# Patient Record
Sex: MALE | Race: WHITE | NOT HISPANIC OR LATINO | URBAN - NONMETROPOLITAN AREA
[De-identification: names, ages, dates, MRNs, and addresses within clinical notes are randomized per-mention and may not be internally consistent; named-entity substitution may affect disease eponyms.]

---

## 2024-03-14 PROBLEM — I21.19 INFERIOR MYOCARDIAL INFARCTION (MULTI): Status: ACTIVE | Noted: 2024-03-14

## 2024-03-14 PROBLEM — I25.10 ATHEROSCLEROSIS OF CORONARY ARTERY OF NATIVE HEART: Status: ACTIVE | Noted: 2024-03-14

## 2024-03-14 PROBLEM — E78.2 MIXED HYPERLIPIDEMIA: Status: ACTIVE | Noted: 2024-03-14

## 2024-03-14 PROBLEM — Z87.891 FORMER SMOKER: Status: ACTIVE | Noted: 2024-03-14

## 2024-03-14 PROBLEM — I10 ESSENTIAL HYPERTENSION: Status: ACTIVE | Noted: 2024-03-14

## 2024-03-14 PROBLEM — Z98.62 STATUS POST ANGIOPLASTY: Status: ACTIVE | Noted: 2024-03-14

## 2024-03-14 RX ORDER — ATORVASTATIN CALCIUM 80 MG/1
1 TABLET, FILM COATED ORAL DAILY
COMMUNITY
Start: 2021-09-21

## 2024-03-14 RX ORDER — CARVEDILOL 6.25 MG/1
1 TABLET ORAL 2 TIMES DAILY
COMMUNITY
Start: 2021-09-05

## 2024-03-14 RX ORDER — NITROGLYCERIN 0.4 MG/1
0.4 TABLET SUBLINGUAL EVERY 5 MIN PRN
COMMUNITY

## 2024-03-14 RX ORDER — ASPIRIN 81 MG/1
1 TABLET ORAL DAILY
COMMUNITY

## 2024-08-21 ENCOUNTER — APPOINTMENT (OUTPATIENT)
Dept: CARDIOLOGY | Facility: CLINIC | Age: 59
End: 2024-08-21
Payer: COMMERCIAL

## 2024-09-12 DIAGNOSIS — I25.10 ATHEROSCLEROTIC HEART DISEASE OF NATIVE CORONARY ARTERY WITHOUT ANGINA PECTORIS: ICD-10-CM

## 2024-09-12 RX ORDER — ATORVASTATIN CALCIUM 80 MG/1
80 TABLET, FILM COATED ORAL DAILY
Qty: 90 TABLET | Refills: 0 | Status: SHIPPED | OUTPATIENT
Start: 2024-09-12

## 2024-09-12 RX ORDER — CARVEDILOL 6.25 MG/1
6.25 TABLET ORAL 2 TIMES DAILY
Qty: 180 TABLET | Refills: 0 | Status: SHIPPED | OUTPATIENT
Start: 2024-09-12

## 2024-09-17 ENCOUNTER — APPOINTMENT (OUTPATIENT)
Dept: CARDIOLOGY | Facility: CLINIC | Age: 59
End: 2024-09-17
Payer: COMMERCIAL

## 2024-09-17 VITALS
HEART RATE: 68 BPM | WEIGHT: 233 LBS | BODY MASS INDEX: 33.36 KG/M2 | DIASTOLIC BLOOD PRESSURE: 72 MMHG | SYSTOLIC BLOOD PRESSURE: 134 MMHG | HEIGHT: 70 IN

## 2024-09-17 DIAGNOSIS — I25.10 ATHEROSCLEROTIC HEART DISEASE OF NATIVE CORONARY ARTERY WITHOUT ANGINA PECTORIS: ICD-10-CM

## 2024-09-17 DIAGNOSIS — Z98.61 HX OF PERCUTANEOUS TRANSLUMINAL CORONARY ANGIOPLASTY: ICD-10-CM

## 2024-09-17 DIAGNOSIS — I21.19 INFERIOR MYOCARDIAL INFARCTION (MULTI): ICD-10-CM

## 2024-09-17 DIAGNOSIS — I10 ESSENTIAL HYPERTENSION: ICD-10-CM

## 2024-09-17 DIAGNOSIS — I25.10 ATHEROSCLEROSIS OF NATIVE CORONARY ARTERY OF NATIVE HEART WITHOUT ANGINA PECTORIS: ICD-10-CM

## 2024-09-17 DIAGNOSIS — Z87.891 FORMER SMOKER: ICD-10-CM

## 2024-09-17 DIAGNOSIS — Z13.1 DIABETES MELLITUS SCREENING: ICD-10-CM

## 2024-09-17 PROCEDURE — 3075F SYST BP GE 130 - 139MM HG: CPT | Performed by: INTERNAL MEDICINE

## 2024-09-17 PROCEDURE — 99213 OFFICE O/P EST LOW 20 MIN: CPT | Performed by: INTERNAL MEDICINE

## 2024-09-17 PROCEDURE — 3008F BODY MASS INDEX DOCD: CPT | Performed by: INTERNAL MEDICINE

## 2024-09-17 PROCEDURE — 3078F DIAST BP <80 MM HG: CPT | Performed by: INTERNAL MEDICINE

## 2024-09-17 RX ORDER — CARVEDILOL 6.25 MG/1
6.25 TABLET ORAL 2 TIMES DAILY
Qty: 180 TABLET | Refills: 0 | Status: SHIPPED | OUTPATIENT
Start: 2024-09-17

## 2024-09-17 RX ORDER — ATORVASTATIN CALCIUM 80 MG/1
80 TABLET, FILM COATED ORAL DAILY
Qty: 90 TABLET | Refills: 0 | Status: SHIPPED | OUTPATIENT
Start: 2024-09-17

## 2024-09-17 NOTE — PATIENT INSTRUCTIONS
Please bring all medicines, vitamins, and herbal supplements with you when you come to the office.    Prescriptions will not be filled unless you are compliant with your follow up appointments or have a follow up appointment scheduled as per instruction of your physician. Refills should be requested at the time of your visit.   BMI was above normal measurement. Current weight: 106 kg (233 lb)  Weight change since last visit (-) denotes wt loss 6 lbs   Weight loss needed to achieve BMI 25: 59.1 Lbs  Weight loss needed to achieve BMI 30: 24.4 Lbs  Advised to Increase physical activity.

## 2024-09-17 NOTE — PROGRESS NOTES
"Subjective   Soham Herrera is a 59 y.o. 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  He professes no disability, shortness of breath he denies tobacco use, quit 17 years ago, denies diabetes.  He does not have a primary care physician.  He remains on aspirin, atorvastatin and carvedilol.    He sustained inferior STEMI in 2021 with primary revascularization of the RCA with preserved LV function.    He has underlying obesity.    Recommendations, dietary discretion, weight loss and exercise, obtain lipid panel as well as fasting glucose, will follow-up again in 1 year         Review of Systems   All other systems reviewed and are negative.           Vitals:    09/17/24 1505   BP: 134/72   BP Location: Right arm   Patient Position: Sitting   Pulse: 68   Weight: 106 kg (233 lb)   Height: 1.778 m (5' 10\")        Objective   Physical Exam  Constitutional:       Appearance: Normal appearance.   HENT:      Nose: Nose normal.   Neck:      Vascular: No carotid bruit.   Cardiovascular:      Rate and Rhythm: Normal rate.      Pulses: Normal pulses.      Heart sounds: Normal heart sounds.   Pulmonary:      Effort: Pulmonary effort is normal.   Abdominal:      General: Bowel sounds are normal.      Palpations: Abdomen is soft.   Musculoskeletal:         General: Normal range of motion.      Cervical back: Normal range of motion.      Right lower leg: No edema.      Left lower leg: No edema.   Skin:     General: Skin is warm and dry.   Neurological:      General: No focal deficit present.      Mental Status: He is alert.   Psychiatric:         Mood and Affect: Mood normal.         Behavior: Behavior normal.         Thought Content: Thought content normal.         Judgment: Judgment normal. "         Allergies  Penicillins     Current Medications    Current Outpatient Medications:     aspirin 81 mg EC tablet, Take 1 tablet (81 mg) by mouth once daily., Disp: , Rfl:     atorvastatin (Lipitor) 80 mg tablet, TAKE 1 TABLET BY MOUTH DAILY, Disp: 90 tablet, Rfl: 0    carvedilol (Coreg) 6.25 mg tablet, TAKE 1 TABLET BY MOUTH TWICE DAILY, Disp: 180 tablet, Rfl: 0    nitroglycerin (Nitrostat) 0.4 mg SL tablet, Place 1 tablet (0.4 mg) under the tongue every 5 minutes if needed for chest pain., Disp: , Rfl:                      Assessment/Plan   1. Atherosclerotic heart disease of native coronary artery without angina pectoris        2. Atherosclerosis of native coronary artery of native heart without angina pectoris        3. Inferior myocardial infarction (Multi)        4. Hx of percutaneous transluminal coronary angioplasty        5. Former smoker        6. Diabetes mellitus screening        7. Essential hypertension                 Scribe Attestation  By signing my name below, ICecile LPN  , Scribe   attest that this documentation has been prepared under the direction and in the presence of Abhay Tavares DO.     Provider Attestation - Scribe documentation    All medical record entries made by the Scribe were at my direction and personally dictated by me. I have reviewed the chart and agree that the record accurately reflects my personal performance of the history, physical exam, discussion and plan.

## 2025-03-18 DIAGNOSIS — I25.10 ATHEROSCLEROTIC HEART DISEASE OF NATIVE CORONARY ARTERY WITHOUT ANGINA PECTORIS: ICD-10-CM

## 2025-03-18 RX ORDER — ATORVASTATIN CALCIUM 80 MG/1
80 TABLET, FILM COATED ORAL DAILY
Qty: 90 TABLET | Refills: 3 | Status: SHIPPED | OUTPATIENT
Start: 2025-03-18

## 2025-03-18 RX ORDER — CARVEDILOL 6.25 MG/1
6.25 TABLET ORAL 2 TIMES DAILY
Qty: 180 TABLET | Refills: 3 | Status: SHIPPED | OUTPATIENT
Start: 2025-03-18

## 2025-06-21 ENCOUNTER — CLINICAL SUPPORT (OUTPATIENT)
Dept: EMERGENCY MEDICINE | Facility: HOSPITAL | Age: 60
End: 2025-06-21
Payer: COMMERCIAL

## 2025-06-21 ENCOUNTER — HOSPITAL ENCOUNTER (EMERGENCY)
Facility: HOSPITAL | Age: 60
Discharge: HOME | End: 2025-06-21
Payer: COMMERCIAL

## 2025-06-21 VITALS
OXYGEN SATURATION: 96 % | RESPIRATION RATE: 16 BRPM | DIASTOLIC BLOOD PRESSURE: 81 MMHG | SYSTOLIC BLOOD PRESSURE: 116 MMHG | TEMPERATURE: 98.3 F | HEART RATE: 95 BPM

## 2025-06-21 DIAGNOSIS — K04.7 DENTAL ABSCESS: Primary | ICD-10-CM

## 2025-06-21 LAB — CARDIAC TROPONIN I PNL SERPL HS: 5 NG/L (ref 0–53)

## 2025-06-21 PROCEDURE — 2500000004 HC RX 250 GENERAL PHARMACY W/ HCPCS (ALT 636 FOR OP/ED): Performed by: PHYSICIAN ASSISTANT

## 2025-06-21 PROCEDURE — 99284 EMERGENCY DEPT VISIT MOD MDM: CPT | Mod: 25

## 2025-06-21 PROCEDURE — 36415 COLL VENOUS BLD VENIPUNCTURE: CPT | Performed by: PHYSICIAN ASSISTANT

## 2025-06-21 PROCEDURE — 93010 ELECTROCARDIOGRAM REPORT: CPT | Performed by: PHYSICIAN ASSISTANT

## 2025-06-21 PROCEDURE — 93005 ELECTROCARDIOGRAM TRACING: CPT

## 2025-06-21 PROCEDURE — 99285 EMERGENCY DEPT VISIT HI MDM: CPT | Performed by: PHYSICIAN ASSISTANT

## 2025-06-21 PROCEDURE — 96365 THER/PROPH/DIAG IV INF INIT: CPT

## 2025-06-21 PROCEDURE — 96367 TX/PROPH/DG ADDL SEQ IV INF: CPT

## 2025-06-21 PROCEDURE — 96375 TX/PRO/DX INJ NEW DRUG ADDON: CPT

## 2025-06-21 PROCEDURE — 84484 ASSAY OF TROPONIN QUANT: CPT | Performed by: PHYSICIAN ASSISTANT

## 2025-06-21 RX ORDER — LIDOCAINE HYDROCHLORIDE AND EPINEPHRINE 10; 10 UG/ML; MG/ML
20 INJECTION, SOLUTION INFILTRATION; PERINEURAL ONCE
Status: DISCONTINUED | OUTPATIENT
Start: 2025-06-21 | End: 2025-06-21 | Stop reason: HOSPADM

## 2025-06-21 RX ORDER — LEVOFLOXACIN 750 MG/1
750 TABLET, FILM COATED ORAL EVERY 24 HOURS
Qty: 7 TABLET | Refills: 0 | Status: SHIPPED | OUTPATIENT
Start: 2025-06-21 | End: 2025-06-28

## 2025-06-21 RX ORDER — METRONIDAZOLE 500 MG/100ML
500 INJECTION, SOLUTION INTRAVENOUS ONCE
Status: COMPLETED | OUTPATIENT
Start: 2025-06-21 | End: 2025-06-21

## 2025-06-21 RX ORDER — DEXAMETHASONE SODIUM PHOSPHATE 10 MG/ML
10 INJECTION INTRAMUSCULAR; INTRAVENOUS EVERY 8 HOURS
Status: DISCONTINUED | OUTPATIENT
Start: 2025-06-21 | End: 2025-06-21 | Stop reason: HOSPADM

## 2025-06-21 RX ORDER — LEVOFLOXACIN 5 MG/ML
500 INJECTION, SOLUTION INTRAVENOUS ONCE
Status: COMPLETED | OUTPATIENT
Start: 2025-06-21 | End: 2025-06-21

## 2025-06-21 RX ORDER — ONDANSETRON 4 MG/1
4 TABLET, ORALLY DISINTEGRATING ORAL ONCE
Status: COMPLETED | OUTPATIENT
Start: 2025-06-21 | End: 2025-06-21

## 2025-06-21 RX ORDER — METRONIDAZOLE 500 MG/1
500 TABLET ORAL 3 TIMES DAILY
Qty: 21 TABLET | Refills: 0 | Status: SHIPPED | OUTPATIENT
Start: 2025-06-21 | End: 2025-06-28

## 2025-06-21 RX ORDER — CHLORHEXIDINE GLUCONATE ORAL RINSE 1.2 MG/ML
15 SOLUTION DENTAL AS NEEDED
Qty: 120 ML | Refills: 0 | Status: SHIPPED | OUTPATIENT
Start: 2025-06-21 | End: 2025-07-05

## 2025-06-21 RX ORDER — METHYLPREDNISOLONE 4 MG/1
TABLET ORAL
Qty: 1 EACH | Refills: 0 | Status: SHIPPED | OUTPATIENT
Start: 2025-06-21

## 2025-06-21 RX ADMIN — LEVOFLOXACIN 500 MG: 500 INJECTION, SOLUTION INTRAVENOUS at 12:54

## 2025-06-21 RX ADMIN — METRONIDAZOLE 500 MG: 500 INJECTION, SOLUTION INTRAVENOUS at 14:02

## 2025-06-21 RX ADMIN — ONDANSETRON 4 MG: 4 TABLET, ORALLY DISINTEGRATING ORAL at 12:28

## 2025-06-21 RX ADMIN — DEXAMETHASONE SODIUM PHOSPHATE 10 MG: 10 INJECTION INTRAMUSCULAR; INTRAVENOUS at 12:27

## 2025-06-21 ASSESSMENT — PAIN SCALES - GENERAL: PAINLEVEL_OUTOF10: 4

## 2025-06-21 ASSESSMENT — PAIN DESCRIPTION - PROGRESSION: CLINICAL_PROGRESSION: GRADUALLY IMPROVING

## 2025-06-21 ASSESSMENT — PAIN DESCRIPTION - PAIN TYPE: TYPE: ACUTE PAIN

## 2025-06-21 ASSESSMENT — PAIN - FUNCTIONAL ASSESSMENT: PAIN_FUNCTIONAL_ASSESSMENT: 0-10

## 2025-06-21 NOTE — ED PROVIDER NOTES
Emergency Department Provider Note        History of Present Illness     History provided by: Patient  Limitations to History: None  External Records Reviewed with Brief Summary: records from [facility]    HPI:  Soham Herrera is a 60 y.o. male who presents today for evaluation of a dental abscess, he is a transfer from [facility] to see oral and maxillofacial surgery.  Patient states that on Thursday he started having dental pain in the right lower jaw bite yesterday when he started noticing swelling and states that that that it rapidly progressed overnight.  Patient was provided with clindamycin as well as Toradol at [facility] and feels like it went down a little bit, denies any difficulty breathing swallowing or managing secretions.  His pain is well-controlled although he does feel little bit nauseated and started developing some left-sided chest pain on his way over here.  No fevers or chills.  White blood cell count at [facility] was 13.  Patient also had a CT PE study done there since he was tachycardic and this was negative.    Physical Exam   Triage vitals:  T 36.8 °C (98.3 °F)  HR 95  /81  RR 16  O2 96 % None (Room air)    Physical Exam  Vitals and nursing note reviewed.   Constitutional:       General: He is not in acute distress.     Appearance: Normal appearance. He is not toxic-appearing.   HENT:      Head: Normocephalic and atraumatic.      Nose: Nose normal.      Mouth/Throat:      Comments: Poor dentition noted throughout the right back molars, right-sided facial swelling and tenderness present, no overlying skin changes, no trismus.  Eyes:      Extraocular Movements: Extraocular movements intact.   Cardiovascular:      Rate and Rhythm: Normal rate and regular rhythm.   Pulmonary:      Effort: Pulmonary effort is normal.   Abdominal:      Palpations: Abdomen is soft.   Musculoskeletal:         General: Normal range of motion.      Cervical back: Normal range of motion and neck  supple.   Skin:     General: Skin is warm and dry.   Neurological:      General: No focal deficit present.      Mental Status: He is alert.   Psychiatric:         Mood and Affect: Mood normal.         Thought Content: Thought content normal.        No orders to display     Labs Reviewed   TROPONIN I, HIGH SENSITIVITY - Normal       Result Value    Troponin I, High Sensitivity (CMC) 5      Narrative:     Less than 99th percentile of normal range cutoff-  Female and children under 18 years old <35 ng/L; Male <54 ng/L: Negative  Repeat testing should be performed if clinically indicated.     Female and children under 18 years old  ng/L; Male  ng/L:  Consistent with possible cardiac damage and possible increased clinical   risk. Serial measurements may help to assess extent of myocardial damage.     >120 ng/L: Consistent with cardiac damage, increased clinical risk and  myocardial infarction. Serial measurements may help assess extent of   myocardial damage.      NOTE: Children less than 1 year old may have higher baseline troponin   levels and results should be interpreted in conjunction with the overall   clinical context.    NOTE: Troponin I testing is performed using a different   testing methodology at [facility] than at other   [facility] hospitals. Direct result comparisons should only   be made within the same method.       ED Course as of 25 1522   Sat 2025   1342 ECG 12 lead    NSR, right bundle branch block, rate of 100, , QRS 94, QTc 446, normal axis, no ischemic changes.   [MC]      ED Course User Index  [MC] Allie Leggett PA-C         Diagnoses as of 25 1522   Dental abscess         Medical Decision Making & ED Course   Medical Decision Makin y.o. male presents today for oral maxillofacial surgery consult for evaluation of a dental abscess, he also had a CT PE study done at outlying facility due to tachycardia which was negative per documentation.   Patient did endorse that he had some chest pain on his way here so I did repeat an EKG, also consulted oral maxillofacial surgeon, he denies any pain right now but endorses nausea was provided with Zofran.    Patient was seen and evaluated by OMFS who recommended IV Levaquin Flagyl and dexamethasone, I did do an EKG and a troponin which were unremarkable, he had a CT PE study at outlying facility.  OMFS was able to drain the abscess, recommended discharge with Flagyl, Levaquin, Medrol Dosepak and Peridex, I also gave him OMFS clinic follow-up although patient is from [facility] and will likely follow-up with a dentist over near him.  He is aware of signs and symptoms that warrant return to the ER.  Return precautions discussed.  ----      Differential diagnoses considered include but are not limited to: Dental abscess, etc.     Social Determinants of Health which Significantly Impact Care: None identified     EKG Independent Interpretation: EKG interpreted by myself. Please see ED Course for full interpretation.    Independent Result Review and Interpretation: Relevant laboratory and radiographic results were reviewed and independently interpreted by myself.  As necessary, they are commented on in the ED Course.    Chronic conditions affecting the patient's care: As documented above in [facility]    The patient was discussed with the following consultants/services: omfs    Care Considerations: As documented above in [facility]    ED Course:  ED Course as of 06/21/25 1522   Sat Jun 21, 2025   1342 ECG 12 lead    NSR, right bundle branch block, rate of 100, , QRS 94, QTc 446, normal axis, no ischemic changes.   [MC]      ED Course User Index  [MC] Allie Leggett PA-C         Diagnoses as of 06/21/25 1522   Dental abscess     Disposition   As a result of the work-up, the patient was discharged home.  he was informed of his diagnosis and instructed to come back with any concerns or worsening of condition.  he and was  agreeable to the plan as discussed above.  he was given the opportunity to ask questions.  All of the patient's questions were answered.    Procedures   Procedures    Patient was seen independently    Allie Leggett PA-C  Emergency Medicine       Allie Leggett PA-C  06/21/25 9168

## 2025-06-21 NOTE — DISCHARGE INSTRUCTIONS
Please return to the ER or seek immediate medical attention if you experience fever of 100.4 (38C) or higher that does not respond to acetaminophen or ibuprofen, persistent vomiting, inability to open your jaw, hot potato voice, stridor, difficulty with physically swallowing, difficulty breathing, chest pain, or worsening of your current symptoms    You are welcome back any time. Thank you for entrusting your care to us, I hope we made your visit as pleasant as possible. Wishing you well!    Allie Leggett PA-C

## 2025-06-21 NOTE — CONSULTS
Reason For Consult  Facial swelling    History Of Present Illness  Soham Herrera is a 60 y.o. male presenting with right sided facial swelling. Patient reports a lower right tooth began hurting on 6/19/25 and swelling began on 6/20 of the right face. Patient sought care at outside facility and was transferred to [facility] ED for further care - OMFS consulted for facial swelling. Patient reports moderate pain controlled with medication, and denies dyspnea or dysphagia. Patient is able to tolerate his own secretions.      Past Medical History  He has no past medical history on file.    Surgical History  He has a past surgical history that includes Other surgical history (02/08/2022); Other surgical history (02/08/2022); and Other surgical history (04/05/2022).     Social History  He reports that he has quit smoking. His smoking use included cigarettes. He does not have any smokeless tobacco history on file. He reports that he does not drink alcohol and does not use drugs.    Family History  Family History[1]     Allergies  Penicillins    Review of Systems  Negative otherwise stated in HPI and provider notes.     Physical Exam  Constitutional: AAOX3, resting comfortably in bed  NEURO: Alert and oriented x3, no gross motor or sensory deficits.  PULM: Breathing comfortably on RA  GI: Abd soft, nontender, nondistended,  Skin: Warm and dry. No rashes or lesions noted.  Eyes: PERRL, EOMI. clear sclera  Head:  CN V and VII intact and equal b/l  Right sided facial swelling - concentrated at mandible and cheek  Inferior border of mandible palpable b/l  Neck:  Soft to palpation b/l  Trachea midline  Mouth:  KELSEY 40 mm  Right lower buccal vestibule fullness appreciated  FOM soft non elevated b/l  #29 fractured - TTP  Retained lower right molar root tips  Several edentulous spaces  Extremities: YEN  PSYCH: Appropriate mood and behavior       Last Recorded Vitals  Blood pressure 116/81, pulse 95, temperature 36.8 °C (98.3 °F),  temperature source Oral, resp. rate 16, SpO2 96%.    Relevant Results  CT Facial Bones w contrast in PACS     Assessment/Plan   Soham Herrera is a 60 y.o. male presenting with right sided facial swelling - likely due to odontogenic infection. Bedside incision and drainage of right mandible performed intra orally. RBA of procedure discussed with patient and verbal consent obtained. 1% lidocaine w epi infiltrated at site of incision. #10 blade used to create 1.5 cm incision in lower right buccal vestibule. Purulence expressed immediately - blunt dissection with hemostats used to further release area of loculations. Copious amount of saline used to irrigate site. Patient tolerated this procedure well. No further surgical intervention per OMFS.       Recs:   - No further surgical intervention per OMFS. OMFS ok for discharge.   - Pain per primary  - Please discharge with   7 day course of levofloxacin 750 mg 1x day and flagyl 500 mg TID  Peridex MW TID  Medrol dosepak  - Patient may follow up with OMFS at outpatient clinic for extraction of indicated teeth located at Department of Oral & Maxillofacial Surgery  [address] [place] [address], 1st Floor ([facility])  [place] [address]    Office phone number: [phone].  Office fax number: [phone].  Team Pager: [phone].  Patients can contact the [email or web address] through the hospital  [phone].        Aide Navarrete DMD         [1]   Family History  Problem Relation Name Age of Onset    No Known Problems Mother

## 2025-09-09 ENCOUNTER — APPOINTMENT (OUTPATIENT)
Dept: CARDIOLOGY | Facility: CLINIC | Age: 60
End: 2025-09-09
Payer: COMMERCIAL

## 2025-11-13 ENCOUNTER — APPOINTMENT (OUTPATIENT)
Dept: CARDIOLOGY | Facility: CLINIC | Age: 60
End: 2025-11-13
Payer: COMMERCIAL